# Patient Record
Sex: FEMALE | Race: WHITE | NOT HISPANIC OR LATINO | URBAN - METROPOLITAN AREA
[De-identification: names, ages, dates, MRNs, and addresses within clinical notes are randomized per-mention and may not be internally consistent; named-entity substitution may affect disease eponyms.]

---

## 2024-06-05 ENCOUNTER — TELEHEALTH PROVIDED OTHER THAN IN PATIENT'S HOME (OUTPATIENT)
Dept: URBAN - METROPOLITAN AREA CLINIC 72 | Facility: CLINIC | Age: 24
End: 2024-06-05

## 2024-06-05 VITALS — SYSTOLIC BLOOD PRESSURE: 118 MMHG | DIASTOLIC BLOOD PRESSURE: 89 MMHG | HEIGHT: 66 IN | WEIGHT: 132 LBS

## 2024-06-05 DIAGNOSIS — R14.0 ABDOMINAL DISTENSION (GASEOUS): ICD-10-CM

## 2024-06-05 DIAGNOSIS — K59.00 CONSTIPATION, UNSPECIFIED: ICD-10-CM

## 2024-06-05 PROCEDURE — 99203 OFFICE O/P NEW LOW 30 MIN: CPT | Mod: GT | Performed by: INTERNAL MEDICINE

## 2024-06-05 NOTE — SERVICEHPINOTES
Patient is seen for an initial visit today. 
br
emiliano 
SHe started noticing a lot of bloating for about 2 years.  She cut out dairy and switched to almond milk.  It helped for a little bit but then more recently it has been worse even without dairy. 
br
emiliano She had celiac and tsh that were negative. SHe had a "lactose" intolerance test that was a blood test that was normal.  She started keeping track of things and noted that bread was a problem.  She went gluten free and that has helped a lot but she still has a lot of issues. 
br
emiliano Lactaid PRN Helps. 
br She also notes gas all the time which is flatulance and is frustrating . 
br Constipation 1-2 times a week where she will go several days without a BM and feel like she has to go and nothing happens.  There is straining with small hard stool.  And she doesnt feel like she is fully eptied.  She will use miralax or herbal tea.  She started using AG 1 greens drink that helps. 
br She drinks rare diet drinks.  Less then once a week. br 
br
br
emiliano My nurse has reviewed and updated the medication list with the patient. I have reviewed the medication list along with the documented medical, social and family history. Pertinent details are also noted above in the HPI.

## 2024-06-05 NOTE — SERVICENOTES
Telehealth Platform Used: [employer]
Location of patient Home
Location of provider Office
Other persons participating: Alone